# Patient Record
Sex: FEMALE | Race: OTHER | HISPANIC OR LATINO | URBAN - METROPOLITAN AREA
[De-identification: names, ages, dates, MRNs, and addresses within clinical notes are randomized per-mention and may not be internally consistent; named-entity substitution may affect disease eponyms.]

---

## 2018-09-08 ENCOUNTER — EMERGENCY (EMERGENCY)
Facility: HOSPITAL | Age: 63
LOS: 1 days | Discharge: HOME | End: 2018-09-08
Attending: EMERGENCY MEDICINE
Payer: SELF-PAY

## 2018-09-08 VITALS
SYSTOLIC BLOOD PRESSURE: 148 MMHG | OXYGEN SATURATION: 99 % | HEART RATE: 70 BPM | RESPIRATION RATE: 18 BRPM | DIASTOLIC BLOOD PRESSURE: 91 MMHG | TEMPERATURE: 99 F

## 2018-09-08 VITALS — HEIGHT: 62 IN | WEIGHT: 139.99 LBS

## 2018-09-08 PROCEDURE — 99283 EMERGENCY DEPT VISIT LOW MDM: CPT

## 2018-09-08 PROCEDURE — 99282 EMERGENCY DEPT VISIT SF MDM: CPT

## 2018-09-08 RX ORDER — VANCOMYCIN HCL 1 G
1000 VIAL (EA) INTRAVENOUS ONCE
Qty: 0 | Refills: 0 | Status: DISCONTINUED | OUTPATIENT
Start: 2018-09-08 | End: 2018-09-13

## 2018-09-08 RX ORDER — CEFEPIME 1 G/1
1000 INJECTION, POWDER, FOR SOLUTION INTRAMUSCULAR; INTRAVENOUS ONCE
Qty: 0 | Refills: 0 | Status: DISCONTINUED | OUTPATIENT
Start: 2018-09-08 | End: 2018-09-13

## 2018-09-08 NOTE — ED PROVIDER NOTE - PHYSICAL EXAMINATION
Constitutional: Alert, NAD.   ENT: Airway patent. Nose clear. Mouth with normal mucosa.   Head: Atraumatic.   Eyes: Clear bilaterally. PERRL.   Cardiac: Normal rate.   Respiratory: Breath sounds clear bilaterally.   GI: Abdomen soft, non-tender, no guarding.   : No CVA or bladder tenderness.   Musculoskeletal: FROM, no muscle or joint tenderness or swelling.   Neuro: alert and oriented, no focal deficits, no motor or sensory deficits.   Skin: 5x5 cm growth on back , black in color, drainage and surrounding cellulitis. No drainable collection noted.   Psych: normal mood and affect.

## 2018-09-08 NOTE — ED PROVIDER NOTE - NS ED ROS FT
no fever  no chest pain  no SOB  no abd pain  no HA  +growth to left upper back  All other ROS negative except as per HPI

## 2018-09-08 NOTE — ED ADULT TRIAGE NOTE - CHIEF COMPLAINT QUOTE
Patient arrived to ED today with c/o headaches for the past two days and patient has had a growth on her back for over a month.

## 2018-09-11 PROBLEM — Z00.00 ENCOUNTER FOR PREVENTIVE HEALTH EXAMINATION: Status: ACTIVE | Noted: 2018-09-11

## 2018-11-27 ENCOUNTER — APPOINTMENT (OUTPATIENT)
Dept: DERMATOLOGY | Facility: CLINIC | Age: 63
End: 2018-11-27

## 2023-03-13 NOTE — ED PROVIDER NOTE - OBJECTIVE STATEMENT
[de-identified] : At this time I explained in great details the risks and benefits of surgery versus not surgery with pregnancy.  I do recommend an ORIF of the right ankle due to the type of fracture she sustained.  I do not recommend leaving this as a has a very high chance of not healing correctly or not healing at all.  I do want the patient to remain nonweightbearing and continue with elevation of her ankle for swelling control on a daily basis.  She will continue with Lovenox injections daily for DVT prophylaxis.  I reviewed the CT scan results with her as well as the printed x-ray she had from [facility].  She is going to speak with our surgical coordinator to get on the surgical schedule in the near future for a, " right ankle ORIF."  The risks and benefits of the surgery were discussed with her in great details and all of her and her spouse's questions were answered.  She will need clearance and lab work prior to surgery.  I also want her to speak to her OB/GYN and get a clearance from her prior to surgery.\par \par All risks, benefits and alternatives to the recommended surgical procedure were discussed which include but are not limited to bleeding, infection, nerve damage, vascular damage, failure of the wound to heal, the need for further surgery, loss of limb, DVT, PE, loss of life as well as the risks associated with general anesthesia. The patient verbalized understanding and provided informed consent to move forward with surgery.
CC: 70 y/o F presents to the ED c/o growth on upper left back which onset 6 months ago. Pt states that it is progressively getting larger and currently black with drainage. Pt notes of discomfort and itching to the area. She has not yet been seen by anyone, has no outpatient care, or f/u. Pt's friend told pt to come in and get it checked. Pt notes of subjective fever today. She denies any pain.  Presenting symptoms: growth to upper left back  Pertinent Positives: itching, discomfort  Pertinent Negatives: no pain  Timin months  Quality: NONE  Radiation: NONE  Severity: Mild  Aggravating Factors: NONE  Relieving Factors: NONE